# Patient Record
Sex: FEMALE | Race: WHITE | NOT HISPANIC OR LATINO | Employment: OTHER | URBAN - METROPOLITAN AREA
[De-identification: names, ages, dates, MRNs, and addresses within clinical notes are randomized per-mention and may not be internally consistent; named-entity substitution may affect disease eponyms.]

---

## 2025-01-10 ENCOUNTER — OFFICE VISIT (OUTPATIENT)
Dept: OTOLARYNGOLOGY | Facility: CLINIC | Age: 70
End: 2025-01-10
Payer: COMMERCIAL

## 2025-01-10 DIAGNOSIS — J34.89 NASAL AND SINUS DISCHARGE: Primary | ICD-10-CM

## 2025-01-10 DIAGNOSIS — J31.0 CHRONIC RHINITIS: ICD-10-CM

## 2025-01-10 DIAGNOSIS — J34.2 NASAL SEPTAL DEVIATION: ICD-10-CM

## 2025-01-10 PROCEDURE — 99204 OFFICE O/P NEW MOD 45 MIN: CPT | Performed by: NURSE PRACTITIONER

## 2025-01-10 PROCEDURE — 1036F TOBACCO NON-USER: CPT | Performed by: NURSE PRACTITIONER

## 2025-01-10 PROCEDURE — 1160F RVW MEDS BY RX/DR IN RCRD: CPT | Performed by: NURSE PRACTITIONER

## 2025-01-10 PROCEDURE — 1159F MED LIST DOCD IN RCRD: CPT | Performed by: NURSE PRACTITIONER

## 2025-01-10 PROCEDURE — 31231 NASAL ENDOSCOPY DX: CPT | Performed by: NURSE PRACTITIONER

## 2025-01-10 NOTE — PROGRESS NOTES
Subjective   Patient ID: Kiersten Man is a 69 y.o. female who presents for No chief complaint on file..  HPI  Kiersten Man is a 69 y.o. old female   presenting with complaints of sinus and nose problems that began about 3 weeks ago. The patient describes the sinus/nose problems as sudden onset of right sided clear to yellow nasal drainage. She notes that symptoms started suddenly after having a viral illness prior to camila. Symptoms occurred for only a few days, and last day of drainage was on 1/2/25. She denies any head injury, history of prior sinus surgery, or ability to stimulate drainage. She has history of bilateral nasal obstruction and uses nasacort for this. Patient denies facial pressure, facial pain, periorbital edema, throat clearing, hoarseness, loss of taste, and loss of smell. The patient denies epistaxis. The patient has taken nasacort medications to alleviate the problem. The medication nasacort has helped. The patient has not tried nasal saline irrigations. Does have a history of allergic rhinitis or allergies. History of allergy immunotherapy. They deny a history of aspirin sensitivity. They report 0 sinus infections per year requiring antibiotic treatment. They deny recent antibiotic usage.     History of prior nasal/sinus surgery or procedure: Denies    Review of Systems  Review of systems is negative for constitutional, ophthalmological, cardiac, pulmonary, renal, gastrointestinal, musculoskeletal, mental health, endocrine, or neurologic disorders (except as listed in the HPI, PMH, and Problem List).     Objective   Physical Exam  CONSTITUTIONAL: Vital signs reviewed. Patient appears well developed and well nourished.   GENERAL: this is a healthy appearing female who appears stated age. The patient is alert and appropriately verbally conversant without hoarseness. This patient is in no apparent distress.   FACE: The face was inspected and no cutaneous masses or lesions were visualized.  There was no erythema or edema noted. Facial movement was symmetric. No skin lesions were detected. There was no sinus tenderness elicited. TMJ crepitus present.   EYES: Extra-ocular muscle function was intact. No nystagmus was observed. Pupils were equal.   CRANIAL NERVES: Cranial nerves II, III, IV, and VI were noted to be intact via extra-ocular muscle movement testing. Cranial nerve VII noted to be intact and symmetric by facial movement. Cranial nerves IX and X noted to be intact by gag reflex and palatal movement. Cranial nerve XII noted to be intact by active and symmetric tongue movement.   NOSE: Examination of the nose revealed the nasal dorsum to be midline. Intranasal exam reveals the septum is deviated left. The inferior turbinates were hypertrophic. No masses, polyps, mucopus, or other lesion on anterior rhinoscopy. See below procedure note as applicable for further exam.  ORAL CAVITY: Examination of the oral cavity revealed no mass lesions nor infection. The palate was noted to be intact. The tongue exhibited normal mobility. Mucosa was moist without lesion. The lips were free of lesion. Gums were free of inflammation. Dentition: normal without obvious infection or inflammation  OROPHARYNX: The oral pharynx was free of mass lesion or mucosal abnormality. The palate was noted to be without lesion. The uvula was normal appearing. The tonsils were Normal.  EARS: Examination of the ears revealed that the auricles were normally formed with no lesions. The external auditory canals were normal. The tympanic membranes were intact.  There is no inflammation visualized.   NECK: Visualization and palpation of the neck revealed no mass lesions. No skin lesions or inflammatory processes were detected. The cervical musculature was normal to palpation.   CERVICAL LYMPHATICS: There were no palpable lymph nodes in the posterior triangle, submandibular triangle, jugulodigastric region, or central neck.  RESPIRATORY:  Normal inspiration and expiration and chest wall expansion, no use of accessory muscles to breathe, no stridor.  NEUROLOGICAL: Patient is ambulatory without assist. Mentation is clear. Answering questions appropriately.     Nasal / sinus endoscopy    Date/Time: 1/10/2025 9:08 AM    Performed by: ELLIE Benson  Authorized by: ELLIE Benson    Consent:     Consent obtained:  Verbal    Consent given by:  Patient    Risks discussed:  Bleeding, infection and pain    Alternatives discussed:  No treatment, observation and delayed treatment  Procedure details:     Indications: assessment of airway and sino-nasal symptoms      Medication:  Afrin and Siddhartha-Synephrine 2%    Instrument: flexible fiberoptic nasal endoscope      Scope location: bilateral nare    Post-procedure details:     Patient tolerance of procedure:  Tolerated well, no immediate complications  Comments:      Findings: After topical decongestion with decongestant and anesthetic spray, nasal endoscopy was performed using an endoscope. The septum was deviated left. The inferior turbinates were healthy.  The middle turbinates appeared healthy, the middle meatus is free of purulence, masses, lesions or polyps. There is a natural os bilaterally that is patent without purulence. There is a clear mucoid drainage at the middle meatus bilaterally. The superior meatus and sphenoethmoid recess are clear bilaterally. The nasal passageway is patent. The nasopharynx was clear. There were no complications and the patient tolerated the procedure well.        Assessment/Plan     Kiersten Man is a 69 y.o. year old female with symptoms and clinical findings consistent with chronic rhinitis, likely a post viral rhinitis. Patient has resolution of unilateral drainage so unlikely CSF rhinorrhea. Will continue nasacort. Incidentally noted to have a left nasal septal deviation.      Plan:  Nasal endoscopy: Findings: left dev, no purulence.  I discussed  the findings the patient and offered reassurance and counseling.  We agreed to proceed with therapeutic measures to address the issues noted above.   1. We will have the patient continue a steroid nasal spray to help improve nasal symptoms.   2. Patient expressed concerns for CSF rhinorrhea. We discussed that she does not have any risk factors for this and symptoms are no longer present. Discussed for a CSF leak, she would need to be able to collect the drainage for a beta2 transferrin, and she reassured me that there is not this much drainage present, nor is she still having symptoms.  3. Patient will follow-up with me as needed.  All questions were answered and patient agrees with established plan of care.

## 2025-01-10 NOTE — PATIENT INSTRUCTIONS
Today you were evaluated by Poornima Fisher CNP.    Please follow-up as needed. If you have any questions or concerns, please contact my office at [phone].     Continue nasacort.

## 2025-02-13 ENCOUNTER — APPOINTMENT (OUTPATIENT)
Dept: OTOLARYNGOLOGY | Facility: CLINIC | Age: 70
End: 2025-02-13
Payer: COMMERCIAL